# Patient Record
Sex: MALE | Race: WHITE | NOT HISPANIC OR LATINO | Employment: UNEMPLOYED | URBAN - METROPOLITAN AREA
[De-identification: names, ages, dates, MRNs, and addresses within clinical notes are randomized per-mention and may not be internally consistent; named-entity substitution may affect disease eponyms.]

---

## 2020-08-25 ENCOUNTER — HOSPITAL ENCOUNTER (EMERGENCY)
Facility: CLINIC | Age: 35
Discharge: HOME | End: 2020-08-26
Attending: EMERGENCY MEDICINE | Admitting: EMERGENCY MEDICINE
Payer: COMMERCIAL

## 2020-08-25 ENCOUNTER — APPOINTMENT (OUTPATIENT)
Dept: CT IMAGING | Facility: CLINIC | Age: 35
End: 2020-08-25
Attending: EMERGENCY MEDICINE
Payer: COMMERCIAL

## 2020-08-25 DIAGNOSIS — F19.10 POLYSUBSTANCE ABUSE (H): ICD-10-CM

## 2020-08-25 DIAGNOSIS — F10.920 ALCOHOLIC INTOXICATION WITHOUT COMPLICATION (H): ICD-10-CM

## 2020-08-25 DIAGNOSIS — T40.601A OPIATE OVERDOSE, ACCIDENTAL OR UNINTENTIONAL, INITIAL ENCOUNTER (H): ICD-10-CM

## 2020-08-25 LAB
ALBUMIN SERPL-MCNC: 3.6 G/DL (ref 3.4–5)
ALP SERPL-CCNC: 185 U/L (ref 40–150)
ALT SERPL W P-5'-P-CCNC: 88 U/L (ref 0–70)
AMMONIA PLAS-SCNC: 42 UMOL/L (ref 10–50)
AMPHETAMINES UR QL SCN: POSITIVE
ANION GAP SERPL CALCULATED.3IONS-SCNC: 8 MMOL/L (ref 3–14)
APAP SERPL-MCNC: <2 MG/L (ref 10–20)
AST SERPL W P-5'-P-CCNC: 258 U/L (ref 0–45)
BARBITURATES UR QL: NEGATIVE
BASOPHILS # BLD AUTO: 0 10E9/L (ref 0–0.2)
BASOPHILS NFR BLD AUTO: 0.4 %
BENZODIAZ UR QL: POSITIVE
BILIRUB SERPL-MCNC: 0.8 MG/DL (ref 0.2–1.3)
BUN SERPL-MCNC: 8 MG/DL (ref 7–30)
CALCIUM SERPL-MCNC: 8.5 MG/DL (ref 8.5–10.1)
CANNABINOIDS UR QL SCN: NEGATIVE
CHLORIDE SERPL-SCNC: 106 MMOL/L (ref 94–109)
CO2 SERPL-SCNC: 25 MMOL/L (ref 20–32)
COCAINE UR QL: NEGATIVE
CREAT SERPL-MCNC: 0.56 MG/DL (ref 0.66–1.25)
DIFFERENTIAL METHOD BLD: ABNORMAL
EOSINOPHIL # BLD AUTO: 0.2 10E9/L (ref 0–0.7)
EOSINOPHIL NFR BLD AUTO: 3.1 %
ERYTHROCYTE [DISTWIDTH] IN BLOOD BY AUTOMATED COUNT: 16.7 % (ref 10–15)
ETHANOL SERPL-MCNC: 0.23 G/DL
GFR SERPL CREATININE-BSD FRML MDRD: >90 ML/MIN/{1.73_M2}
GLUCOSE BLDC GLUCOMTR-MCNC: 87 MG/DL (ref 70–99)
GLUCOSE SERPL-MCNC: 79 MG/DL (ref 70–99)
HCT VFR BLD AUTO: 40 % (ref 40–53)
HGB BLD-MCNC: 13.3 G/DL (ref 13.3–17.7)
IMM GRANULOCYTES # BLD: 0 10E9/L (ref 0–0.4)
IMM GRANULOCYTES NFR BLD: 0 %
INR PPP: 1.19 (ref 0.86–1.14)
INTERPRETATION ECG - MUSE: NORMAL
LACTATE BLD-SCNC: 1.3 MMOL/L (ref 0.7–2)
LYMPHOCYTES # BLD AUTO: 2.4 10E9/L (ref 0.8–5.3)
LYMPHOCYTES NFR BLD AUTO: 43.7 %
MAGNESIUM SERPL-MCNC: 1.9 MG/DL (ref 1.6–2.3)
MCH RBC QN AUTO: 29 PG (ref 26.5–33)
MCHC RBC AUTO-ENTMCNC: 33.3 G/DL (ref 31.5–36.5)
MCV RBC AUTO: 87 FL (ref 78–100)
MONOCYTES # BLD AUTO: 0.4 10E9/L (ref 0–1.3)
MONOCYTES NFR BLD AUTO: 6.5 %
NEUTROPHILS # BLD AUTO: 2.6 10E9/L (ref 1.6–8.3)
NEUTROPHILS NFR BLD AUTO: 46.3 %
NRBC # BLD AUTO: 0 10*3/UL
NRBC BLD AUTO-RTO: 0 /100
OPIATES UR QL SCN: POSITIVE
PCP UR QL SCN: NEGATIVE
PHOSPHATE SERPL-MCNC: 4.2 MG/DL (ref 2.5–4.5)
PLATELET # BLD AUTO: 119 10E9/L (ref 150–450)
POTASSIUM SERPL-SCNC: 3 MMOL/L (ref 3.4–5.3)
PROT SERPL-MCNC: 8.5 G/DL (ref 6.8–8.8)
RBC # BLD AUTO: 4.58 10E12/L (ref 4.4–5.9)
SALICYLATES SERPL-MCNC: <2 MG/DL
SODIUM SERPL-SCNC: 139 MMOL/L (ref 133–144)
TROPONIN I SERPL-MCNC: <0.015 UG/L (ref 0–0.04)
TSH SERPL DL<=0.005 MIU/L-ACNC: 3.68 MU/L (ref 0.4–4)
WBC # BLD AUTO: 5.5 10E9/L (ref 4–11)

## 2020-08-25 PROCEDURE — 80307 DRUG TEST PRSMV CHEM ANLYZR: CPT | Performed by: EMERGENCY MEDICINE

## 2020-08-25 PROCEDURE — 00000146 ZZHCL STATISTIC GLUCOSE BY METER IP

## 2020-08-25 PROCEDURE — 84443 ASSAY THYROID STIM HORMONE: CPT | Performed by: EMERGENCY MEDICINE

## 2020-08-25 PROCEDURE — 96366 THER/PROPH/DIAG IV INF ADDON: CPT

## 2020-08-25 PROCEDURE — 80329 ANALGESICS NON-OPIOID 1 OR 2: CPT | Performed by: EMERGENCY MEDICINE

## 2020-08-25 PROCEDURE — 70450 CT HEAD/BRAIN W/O DYE: CPT

## 2020-08-25 PROCEDURE — 83735 ASSAY OF MAGNESIUM: CPT | Performed by: EMERGENCY MEDICINE

## 2020-08-25 PROCEDURE — 82140 ASSAY OF AMMONIA: CPT | Performed by: EMERGENCY MEDICINE

## 2020-08-25 PROCEDURE — 80053 COMPREHEN METABOLIC PANEL: CPT | Performed by: EMERGENCY MEDICINE

## 2020-08-25 PROCEDURE — 85025 COMPLETE CBC W/AUTO DIFF WBC: CPT | Performed by: EMERGENCY MEDICINE

## 2020-08-25 PROCEDURE — 93005 ELECTROCARDIOGRAM TRACING: CPT

## 2020-08-25 PROCEDURE — 83605 ASSAY OF LACTIC ACID: CPT | Performed by: EMERGENCY MEDICINE

## 2020-08-25 PROCEDURE — 84100 ASSAY OF PHOSPHORUS: CPT | Performed by: EMERGENCY MEDICINE

## 2020-08-25 PROCEDURE — 99285 EMERGENCY DEPT VISIT HI MDM: CPT | Mod: 25

## 2020-08-25 PROCEDURE — 96365 THER/PROPH/DIAG IV INF INIT: CPT

## 2020-08-25 PROCEDURE — 25000128 H RX IP 250 OP 636: Performed by: EMERGENCY MEDICINE

## 2020-08-25 PROCEDURE — 80320 DRUG SCREEN QUANTALCOHOLS: CPT | Performed by: EMERGENCY MEDICINE

## 2020-08-25 PROCEDURE — 84484 ASSAY OF TROPONIN QUANT: CPT | Performed by: EMERGENCY MEDICINE

## 2020-08-25 PROCEDURE — 85610 PROTHROMBIN TIME: CPT | Performed by: EMERGENCY MEDICINE

## 2020-08-25 RX ORDER — POTASSIUM CL/LIDO/0.9 % NACL 10MEQ/0.1L
10 INTRAVENOUS SOLUTION, PIGGYBACK (ML) INTRAVENOUS
Status: COMPLETED | OUTPATIENT
Start: 2020-08-25 | End: 2020-08-26

## 2020-08-25 RX ADMIN — Medication 10 MEQ: at 21:36

## 2020-08-25 RX ADMIN — Medication 10 MEQ: at 23:02

## 2020-08-25 NOTE — ED AVS SNAPSHOT
Emergency Department  [address] [place] [id number]  Phone:  [phone]  Fax:  [phone]                                    Ramana Izquierdo   MRN: [id number]    Department:   Emergency Department   Date of Visit:  8/25/2020           After Visit Summary Signature Page    I have received my discharge instructions, and my questions have been answered. I have discussed any challenges I see with this plan with the nurse or doctor.    ..........................................................................................................................................  Patient/Patient Representative Signature      ..........................................................................................................................................  Patient Representative Print Name and Relationship to Patient    ..................................................               ................................................  Date                                   Time    ..........................................................................................................................................  Reviewed by Signature/Title    ...................................................              ..............................................  Date                                               Time          22EPIC Rev 08/18

## 2020-08-26 VITALS
RESPIRATION RATE: 15 BRPM | DIASTOLIC BLOOD PRESSURE: 80 MMHG | TEMPERATURE: 97.9 F | OXYGEN SATURATION: 99 % | HEART RATE: 79 BPM | SYSTOLIC BLOOD PRESSURE: 119 MMHG

## 2020-08-26 NOTE — ED PROVIDER NOTES
This 34 year old male patient with history of polysubstance abuse was endorsed to me by Dr. Pruitt pending sober reassessment and CT head for disposition planning, likely discharge, after presenting with opiate overdose requiring Narcan. DAYDAY starting 0015 8/26/20.     I have reviewed patient's vitals, EKG, labs, and notes which are remarkable for urine drug screen positive for amphetamines, benzodiazepines, and opiates.  BAL elevated at 0.23.  Sequela of chronic alcohol abuse including thrombocytopenia and mild transaminitis.  Hypokalemia to 3.0 repleted with IV potassium.    CT head resulted while under my care:  CT Head w/o Contrast   Final Result   IMPRESSION:   1.  No acute intracranial process.     0400:  Patient assessed.  He is resting comfortably.  Nursing remarks he was up a couple of hours ago but was still unsteady.  We will continue to monitor for sobriety.    0630: Patient reassessed.  He is awake and alert without slurred speech.  He denies suicidal ideation or intent and is agreeable to discharge.  I counseled him against the use of drugs or alcohol and answered all his questions.    Patient was calm, cooperative, and without complaint while under my care. He required no interventions including no chemical or physical restraint and was discharged when clinically sober.       Selene Snachez MD  08/26/20 2023

## 2020-08-26 NOTE — ED NOTES
"Patient woke up.  Asking where he is.  Denied, but then admitted to using heroin and drinking.  I reminded him he had 3 doses of Narcan to save his life.  He asked to go to the bathroom, but I told him he was too intoxicated to get up.  He asked if he could pee in the sink.  I told him \"No\".  I gave him a urinal.  "

## 2020-08-26 NOTE — DISCHARGE INSTRUCTIONS
Do not use drugs.  You would have  without Narcan.  Do not use alcohol.  Follow-up with primary care.  Return as needed.

## 2020-08-26 NOTE — ED NOTES
Bed: ED17  Expected date: 8/25/20  Expected time: 7:20 PM  Means of arrival: Ambulance  Comments:  HEMS 435 34M overdose

## 2020-08-26 NOTE — ED TRIAGE NOTES
From home.  Heroin and vodka tonight.  Unresponsive.  His friends gave 0.8 IM Narcan, Fire gave 1 mg Narcan, and EMS gave 1 more.  Unresponsive prior to, and now difficult to arouse.

## 2020-08-26 NOTE — ED PROVIDER NOTES
"  History     Chief Complaint:  Drug Overdose      The history is provided by the EMS personnel. The history is limited by the condition of the patient.      Ramana Izquierdo is a 34 year old male who presents via EMS for drug and alcohol use and altered mental status.  EMS reports that the patient was at home with friends who reported the patient was using heroin and alcohol.  They reported that he \"overdosed\" and became unresponsive.  EMS reports that his friends gave him an IM autoinjector of Narcan 0.8 mg and then first responders and EMS gave an additional total of 2 mg of Narcan.  He reportedly improved his mental status after this but remained very somnolent.  No reported history of any trauma.  Due to the patient's altered mental status he is unable to provide any further history.    Allergies:  No Known Drug Allergies    Medications:    Wellbutrin   Celexa   Neurontin   Atarax   Methadone   Nicotine inhaler   Nicorette gum   Seroquel    Protonix   Zoloft    Past Medical History:    Generalized anxiety disorder   Depression   Allergic rhinitis   Tobacco use disorder   Polysubstance abuse  Thrombocytopenia   Alcohol withdrawal   Lactic acidosis   GI bleed   TMJ    Past Surgical History:    Bilateral ear reconstructive surgeries  Zygoma, left   Nasal fracture closed reduction    Family History:    Diabetes - mother   Hypertension - father   Breast Cancer - mother   Alcohol/drug - brother   Allergies - mother   Arthritis - mother   Depression - mother, father, sister, brother  Obesity - brother   Psychotic disorder - mother (agoraphobia), brother   Emphysema - mother     Social History:  The patient was accompanied to the ED by EMS.  Smoking Status: Current Every Day Smoker   Smokeless Tobacco: Current User  Alcohol Use: Positive  Drug Use: Positive   Marital Status:  Single     Review of Systems   Unable to perform ROS: Mental status change       Physical Exam     Patient Vitals for the past 24 hrs:   BP " Temp Temp src Pulse Resp SpO2   08/25/20 1929 -- -- -- -- -- 98 %   08/25/20 1925 (!) 135/100 97.9  F (36.6  C) Oral 98 18 92 %       Physical Exam  General: Somnolent.  Briefly opens eyes in response to his name.   Head:  Scalp, face, and head appear normal, atraumatic.  Eyes:  Pupils are equal, round, and reactive to light.  No nystagmus.    Conjunctivae non-injected and sclerae white  ENT:    The external nose is normal    Pinnae are normal    The oropharynx is normal, mucous membranes moist    Uvula is in the midline  Neck:  Normal range of motion    There is no rigidity noted    Trachea is in the midline  CV:  Regular rate and rhythm     Normal S1/S2, no S3/S4    No murmur or rub. Radial pulses 2+ bilaterally   Resp:  Lungs are clear and equal bilaterally    There is no tachypnea    No increased work of breathing    No rales, wheezing, or rhonchi  GI:  Abdomen is soft, no rigidity or guarding    No distension, or mass    No tenderness or rebound tenderness   MS:  Normal muscular tone.  Full range of motion of all extremities.  No evidence of trauma to the extremities.    Symmetric motor strength    No lower extremity edema  Skin:  Multiple old appearing scattered bruises to the torso, upper arms and sternal area.  No rash or acute skin lesions noted  Neuro: Patient is somnolent.  Does not answer any questions or respond to orientation questions.  Opens eyes and responds to his name.  Follows simple commands such as squeezing hands.  No facial droop.  Patient responds to tactile stimuli in all 4 extremities.  No tremor.  No seizure-like activity.  Psych:  Unable to assess.      Emergency Department Course     ECG:  ECG taken at 1952, ECG read at 2002  Normal sinus rhythm  Prolonged QT   Abnormal ECG  Rate 90 bpm. LA interval 136 ms. QRS duration 92 ms. QT/QTc 394/481 ms. P-R-T axes 26 57 51.    Laboratory:  Laboratory findings were communicated with the patient who voiced understanding of the findings.    CBC:  WBC 5.5, HGB 13.3,  (L)    CMP: Potassium 3.0 (L), AlkPhos 185 (H), ALT 88 (H),  (H) o/w WNL (Creatinine 0.56 (L))    Glucose (Collected 2006): 87    Lactic Acid (Collected 2003): 1.3     INR: 1.19 (H)    Troponin (1942): <0.015     TSH: 3.68    EtOH: 0.23 (H)    Acetaminophen level: <2    Salicylate level: <2    Magnesium: 1.9     Phosphorus: 4.2     Interventions:  2136 Potassium chloride, 10 mEq, IV     Emergency Department Course:     Nursing notes and vitals reviewed.    1942 IV was inserted and blood was drawn for laboratory testing, results above.    1945 I performed an exam of the patient as documented above.     1952 ECG obtained as noted above.    2202 Patient rechecked and updated.      2300 The patient is signed out to my colleague Dr. Sanchez.     Impression & Plan      Medical Decision Making:  Ramana Izquierdo is a 34 year old male who presents to the emergency department today for evaluation of polysubstance abuse, apparent overdose on heroin and altered mental status.  On my evaluation patient is hemodynamically stable afebrile and without any focal neurologic deficits.  He remains very somnolent and intoxicated appearing.  Since arrival in the emergency department he has not required any repeat current doses of Narcan.  There is no evidence of significant traumatic injuries.  Work-up in the emergency department reveals elevated ethanol level of 0.23.  CMP shows mild hypokalemia otherwise renal function is normal.  LFTs are elevated consistent with history of alcohol abuse.  Bilirubin is normal.  Ammonia is normal.  Lactic acid is normal.  Troponin is negative.  TSH is normal.  No evidence of hypoglycemia.  CBC reveals thrombocytopenia but is otherwise unremarkable.  Acetaminophen and salicylate levels are negative.  Urine drug screen is positive for amphetamines opiates and benzodiazepines.  CT scan of the head was obtained and does not show any evidence of acute intracranial  pathology or trauma. The patient will be monitored in the ED until he is clinically sober and will need reassessment at that time to determine need for any further evaluation or workup. The patient was signed out to my partner Dr. Sanchez pending re-evaluation.    Diagnosis:    ICD-10-CM    1. Opiate overdose, accidental or unintentional, initial encounter (H)  T40.601A    2. Alcoholic intoxication without complication (H)  F10.920    3. Polysubstance abuse (H)  F19.10        Disposition:   The patient is signed out to my colleague, Dr. Sanchez.    Scribe Disclosure:  I, Natali Danieltorres, am serving as a scribe at 8:18 PM on 8/25/2020 to document services personally performed by Carlyle Pruitt MD based on my observations and the provider's statements to me.   EMERGENCY DEPARTMENT       Carlyle Pruitt MD  08/26/20 8414

## 2021-12-23 ENCOUNTER — HOSPITAL ENCOUNTER (EMERGENCY)
Facility: CLINIC | Age: 36
Discharge: LEFT BEFORE DISCHARGE | End: 2021-12-23
Attending: EMERGENCY MEDICINE | Admitting: EMERGENCY MEDICINE
Payer: COMMERCIAL

## 2021-12-23 VITALS
TEMPERATURE: 97.6 F | OXYGEN SATURATION: 98 % | HEART RATE: 111 BPM | DIASTOLIC BLOOD PRESSURE: 99 MMHG | RESPIRATION RATE: 16 BRPM | SYSTOLIC BLOOD PRESSURE: 135 MMHG

## 2021-12-23 DIAGNOSIS — Z51.89 VISIT FOR WOUND CHECK: ICD-10-CM

## 2021-12-23 PROCEDURE — 999N000104 HC STATISTIC NO CHARGE

## 2021-12-23 NOTE — ED TRIAGE NOTES
Had stitches placed to right palm on Friday at [facility]. Patient wants wound to be evaluated for possible infection. Patient also reporting a cough.

## 2021-12-23 NOTE — ED PROVIDER NOTES
I went to see this patient at 10:08.  He had just eloped.  The nurses noted that he was pacing a bit in the room.  He appeared to have decisional capacity.  He was here to be seen for a possible cough and a concern of a wound infection to his hand.  The patient noted to the nurses that he needed to leave to go take care of his kids.  The nurse was suspicious that the patient may have been on some kind of a drug given that he looked mildly agitated walking around the room.  He was not aggressive or inappropriate.  He also noted that he would return to [facility] where he had been seen.  Therefore, the patient eloped prior to being seen by the physician.     Ramana Miranda MD  12/23/21 1011

## 2021-12-23 NOTE — ED NOTES
"Patient is extremely anxious, had a laceration on right hand and has stiches on right hand.  Hand look swollen and red.   Pacing in room,  \"  I just want to go home to my kids, I need to see if  I need antibiotics\".  "

## 2025-02-27 ENCOUNTER — OFFICE VISIT (OUTPATIENT)
Dept: URGENT CARE | Facility: URGENT CARE | Age: 40
End: 2025-02-27
Payer: COMMERCIAL

## 2025-02-27 VITALS
BODY MASS INDEX: 26.29 KG/M2 | DIASTOLIC BLOOD PRESSURE: 93 MMHG | TEMPERATURE: 97 F | WEIGHT: 177 LBS | RESPIRATION RATE: 17 BRPM | SYSTOLIC BLOOD PRESSURE: 141 MMHG | OXYGEN SATURATION: 96 % | HEART RATE: 91 BPM

## 2025-02-27 DIAGNOSIS — K04.7 DENTAL ABSCESS: Primary | ICD-10-CM

## 2025-02-27 RX ORDER — AMOXICILLIN 500 MG/1
500 CAPSULE ORAL 3 TIMES DAILY
Qty: 30 CAPSULE | Refills: 0 | Status: SHIPPED | OUTPATIENT
Start: 2025-02-27 | End: 2025-03-09

## 2025-02-27 NOTE — PROGRESS NOTES
Assessment & Plan     Dental abscess  I used an 11 blade to incise the abscess with no anesthesia  Copious amounts of pustular material extruded.  Amox for infection  - amoxicillin (AMOXIL) 500 MG capsule  Dispense: 30 capsule; Refill: 0  - DRAIN SKIN ABSCESS SIMPLE/SINGLE             No follow-ups on file.    Jesús Watkins MD  [facility] LUIS Boles is a 39 year old male who presents to clinic today for the following health issues:  Chief Complaint   Patient presents with    Urgent Care     Pt presents painful swollen gums, teeth were removed a month ago.        HPI    Painful gum  Swelling of gum  Front teeth        Review of Systems        Objective    BP (!) 141/93   Pulse 91   Temp 97  F (36.1  C) (Tympanic)   Resp 17   Wt 80.3 kg (177 lb)   SpO2 96%   BMI 26.29 kg/m    Physical Exam  Vitals and nursing note reviewed.   Constitutional:       Appearance: Normal appearance.   HENT:      Mouth/Throat:      Comments: Upper gum with large abscess  Neurological:      Mental Status: He is alert.